# Patient Record
Sex: MALE | URBAN - METROPOLITAN AREA
[De-identification: names, ages, dates, MRNs, and addresses within clinical notes are randomized per-mention and may not be internally consistent; named-entity substitution may affect disease eponyms.]

---

## 2024-03-13 ENCOUNTER — OFFICE VISIT (OUTPATIENT)
Dept: MEDICAL GROUP | Facility: IMAGING CENTER | Age: 34
End: 2024-03-13
Payer: COMMERCIAL

## 2024-03-13 VITALS
TEMPERATURE: 98.3 F | DIASTOLIC BLOOD PRESSURE: 76 MMHG | HEIGHT: 68 IN | WEIGHT: 299 LBS | OXYGEN SATURATION: 98 % | BODY MASS INDEX: 45.31 KG/M2 | RESPIRATION RATE: 16 BRPM | SYSTOLIC BLOOD PRESSURE: 134 MMHG | HEART RATE: 82 BPM

## 2024-03-13 DIAGNOSIS — Z11.4 SCREENING FOR HIV (HUMAN IMMUNODEFICIENCY VIRUS): ICD-10-CM

## 2024-03-13 DIAGNOSIS — K92.1 HEMATOCHEZIA: ICD-10-CM

## 2024-03-13 DIAGNOSIS — Z11.59 NEED FOR HEPATITIS C SCREENING TEST: ICD-10-CM

## 2024-03-13 DIAGNOSIS — H91.93 DECREASED HEARING OF BOTH EARS: ICD-10-CM

## 2024-03-13 DIAGNOSIS — Z13.21 ENCOUNTER FOR VITAMIN DEFICIENCY SCREENING: ICD-10-CM

## 2024-03-13 DIAGNOSIS — K52.9 FREQUENT STOOLS: ICD-10-CM

## 2024-03-13 DIAGNOSIS — E66.01 CLASS 3 SEVERE OBESITY DUE TO EXCESS CALORIES WITHOUT SERIOUS COMORBIDITY WITH BODY MASS INDEX (BMI) OF 45.0 TO 49.9 IN ADULT (HCC): ICD-10-CM

## 2024-03-13 PROBLEM — E66.813 CLASS 3 SEVERE OBESITY DUE TO EXCESS CALORIES WITHOUT SERIOUS COMORBIDITY WITH BODY MASS INDEX (BMI) OF 45.0 TO 49.9 IN ADULT (HCC): Status: ACTIVE | Noted: 2024-03-13

## 2024-03-13 PROCEDURE — 3075F SYST BP GE 130 - 139MM HG: CPT | Performed by: STUDENT IN AN ORGANIZED HEALTH CARE EDUCATION/TRAINING PROGRAM

## 2024-03-13 PROCEDURE — 99204 OFFICE O/P NEW MOD 45 MIN: CPT | Performed by: STUDENT IN AN ORGANIZED HEALTH CARE EDUCATION/TRAINING PROGRAM

## 2024-03-13 PROCEDURE — 3078F DIAST BP <80 MM HG: CPT | Performed by: STUDENT IN AN ORGANIZED HEALTH CARE EDUCATION/TRAINING PROGRAM

## 2024-03-13 ASSESSMENT — PATIENT HEALTH QUESTIONNAIRE - PHQ9: CLINICAL INTERPRETATION OF PHQ2 SCORE: 0

## 2024-03-13 NOTE — PROGRESS NOTES
Subjective:       CC:   Chief Complaint   Patient presents with    Establish Care    Ear Fullness    Referral Needed     GI       HPI:   Luke is a 33 y.o. male is new to me and is here today to establish care. Previous PCP was none. Specialists:none. Pt would like to discuss the following today:      Establish care: has not seen a PCP in several years.     Referral to GI: Reports having multiple bowel movements throughout the day. Has noticed bloody liquid stools, this is intermittent and has symptoms about every other week. Symptoms started a few years ago.  Denies family history of colon cancer. Denies abdominal pain, nausea, vomiting, unintentional weight loss ,and constipation. Denies history of GI disease.     Referral for hearing test: states his wife is concerned about his hearing because he can't hear at times.  States he misses words from a conversation. Denies pain, injuries, and trauma.     Obesity: states he gained a lot of weight during winter. He is planning on having a baby soon. He is also planning on joining a gym soon.     Health Maintenance/Immunizations: Recommend COVID-19 and influenza immunizations.  Patient declines vaccines.  Per patient he is up-to-date with Tdap.    ROS:   See HPI    Patient Active Problem List    Diagnosis Date Noted    Class 3 severe obesity due to excess calories without serious comorbidity with body mass index (BMI) of 45.0 to 49.9 in adult (HCC) 03/13/2024    Hematochezia 03/13/2024    Frequent stools 03/13/2024    Decreased hearing of both ears 03/13/2024       History reviewed. No pertinent past medical history.    No current outpatient medications on file.     No current facility-administered medications for this visit.       Allergies as of 03/13/2024    (No Known Allergies)       Social History     Socioeconomic History    Marital status: Single     Spouse name: Not on file    Number of children: Not on file    Years of education: Not on file    Highest  "education level: Not on file   Occupational History    Not on file   Tobacco Use    Smoking status: Former     Types: Cigarettes    Smokeless tobacco: Never    Tobacco comments:     1/2 ppd   Vaping Use    Vaping Use: Former   Substance and Sexual Activity    Alcohol use: Yes     Comment: Occasionally    Drug use: Yes     Types: Marijuana     Comment: daily    Sexual activity: Yes     Partners: Female   Other Topics Concern    Not on file   Social History Narrative    Not on file     Social Determinants of Health     Financial Resource Strain: Not on file   Food Insecurity: Not on file   Transportation Needs: Not on file   Physical Activity: Not on file   Stress: Not on file   Social Connections: Not on file   Intimate Partner Violence: Not on file   Housing Stability: Not on file       Family History   Problem Relation Age of Onset    No Known Problems Mother     Hyperlipidemia Father     Hypertension Father     Cancer Sister         Thyroid    Diabetes Neg Hx        History reviewed. No pertinent surgical history.        Objective:     /76 (BP Location: Left arm, Patient Position: Sitting, BP Cuff Size: Large adult)   Pulse 82   Temp 36.8 °C (98.3 °F) (Temporal)   Resp 16   Ht 1.727 m (5' 8\")   Wt (!) 136 kg (299 lb)   SpO2 98%   BMI 45.46 kg/m²     Physical Exam  Vitals reviewed.   Constitutional:       General: He is not in acute distress.     Appearance: Normal appearance.   HENT:      Head: Normocephalic and atraumatic.      Right Ear: Tympanic membrane, ear canal and external ear normal. There is no impacted cerumen.      Left Ear: Tympanic membrane, ear canal and external ear normal. There is no impacted cerumen.      Nose: No congestion.      Mouth/Throat:      Mouth: Mucous membranes are moist.      Pharynx: Oropharynx is clear. No oropharyngeal exudate or posterior oropharyngeal erythema.   Eyes:      General: No scleral icterus.  Neck:      Thyroid: No thyroid mass or thyromegaly.      " Vascular: No carotid bruit.   Cardiovascular:      Rate and Rhythm: Normal rate and regular rhythm.      Pulses: Normal pulses.      Heart sounds: Normal heart sounds. No murmur heard.  Pulmonary:      Effort: Pulmonary effort is normal. No respiratory distress.      Breath sounds: Normal breath sounds. No wheezing.   Abdominal:      General: Bowel sounds are normal. There is no distension.      Palpations: Abdomen is soft. There is no mass.      Tenderness: There is no abdominal tenderness.   Musculoskeletal:         General: No swelling. Normal range of motion.      Cervical back: Normal range of motion and neck supple. No tenderness.   Lymphadenopathy:      Cervical: No cervical adenopathy.   Skin:     General: Skin is warm and dry.      Coloration: Skin is not jaundiced.      Findings: No bruising.   Neurological:      General: No focal deficit present.      Mental Status: He is alert and oriented to person, place, and time.      Gait: Gait normal.   Psychiatric:         Mood and Affect: Mood normal.         Behavior: Behavior normal.         Thought Content: Thought content normal.         Judgment: Judgment normal.            Assessment and Plan:     1. Frequent stools  2. Hematochezia  Chronic. Unknown etiology.  Referred to GI for colonoscopy.  Strict ED precautions given.  - Referral to Gastroenterology    3. Decreased hearing of both ears  Chronic.  There is a small amount of cerumen in both ears, otherwise normal examination.  Referred to audiology for hearing test.  -Referral to Audiology    4. Class 3 severe obesity due to excess calories without serious comorbidity with body mass index (BMI) of 45.0 to 49.9 in adult (HCC)  Chronic and uncontrolled.  Per patient he gained a lot of weight this winter.  Discussed lifestyle modifications to help lose weight and help prevent disease such as diabetes and heart disease.  Screening labs as listed below ordered.  Improve your eating habits. Eat a variety of  foods from each food group: include whole grains, vegetables, fruits, low fat or fat free dairy, and protein foods, mostly plant based. Limit foods high in fat, sugar, calories, and sodium. Eat slowly, and don't do anything else, such as watch TV, while you are eating. Pay attention to portion sizes. Put your food on a smaller plate, follow MyPlate guidelines:vegetables make up the largest section, followed by grains. Together, fruits and vegetables fill half the plate, while proteins and grains fill the other half.  Regular activity can help you feel better, have more energy, and burn more calories. If you haven't been active, start slowly. Start with at least 30 minutes of moderate activity on most days of the week; then gradually increase the amount of activity. Try for 60 or 90 minutes a day, at least 5 days a week.   - HEMOGLOBIN A1C; Future  - TSH WITH REFLEX TO FT4; Future  - Comp Metabolic Panel; Future  - CBC WITH DIFFERENTIAL; Future  - Lipid Profile; Future    5. Need for hepatitis C screening test  - HEP C VIRUS ANTIBODY; Future    6. Screening for HIV (human immunodeficiency virus)  - HIV AG/AB COMBO ASSAY SCREENING; Future    7. Encounter for vitamin deficiency screening  - VITAMIN D 25-HYDROXY       Diagnosis and treatment plan explained to pt. Pt agreed with treatment plan and verbalized understanding.       Return for Schedule follow-up after completing tests.     Please note that this dictation was created using voice recognition software. I have made every reasonable attempt to correct obvious errors, but I expect that there are errors of grammar and possibly content that I did not discover before finalizing the note.    Miriam Katz PA-C  [facility]

## 2024-03-13 NOTE — PATIENT INSTRUCTIONS
Thank you for choosing [facility]. It was a pleasure meeting you today.     Take care!  Miriam VazquezWell[facility]

## 2024-03-21 DIAGNOSIS — Z01.89 ENCOUNTER FOR LABORATORY TEST: ICD-10-CM

## 2024-04-10 ENCOUNTER — APPOINTMENT (OUTPATIENT)
Dept: MEDICAL GROUP | Facility: IMAGING CENTER | Age: 34
End: 2024-04-10
Payer: COMMERCIAL

## 2024-04-10 ENCOUNTER — HOSPITAL ENCOUNTER (OUTPATIENT)
Dept: LAB | Facility: MEDICAL CENTER | Age: 34
End: 2024-04-10
Attending: STUDENT IN AN ORGANIZED HEALTH CARE EDUCATION/TRAINING PROGRAM
Payer: COMMERCIAL

## 2024-04-10 DIAGNOSIS — E66.01 CLASS 3 SEVERE OBESITY DUE TO EXCESS CALORIES WITHOUT SERIOUS COMORBIDITY WITH BODY MASS INDEX (BMI) OF 45.0 TO 49.9 IN ADULT (HCC): ICD-10-CM

## 2024-04-10 DIAGNOSIS — Z01.89 ENCOUNTER FOR LABORATORY TEST: ICD-10-CM

## 2024-04-10 DIAGNOSIS — Z11.4 SCREENING FOR HIV (HUMAN IMMUNODEFICIENCY VIRUS): ICD-10-CM

## 2024-04-10 DIAGNOSIS — Z11.59 NEED FOR HEPATITIS C SCREENING TEST: ICD-10-CM

## 2024-04-10 LAB
25(OH)D3 SERPL-MCNC: 34 NG/ML (ref 30–100)
ALBUMIN SERPL BCP-MCNC: 4.7 G/DL (ref 3.2–4.9)
ALBUMIN/GLOB SERPL: 1.8 G/DL
ALP SERPL-CCNC: 74 U/L (ref 30–99)
ALT SERPL-CCNC: 40 U/L (ref 2–50)
ANION GAP SERPL CALC-SCNC: 12 MMOL/L (ref 7–16)
AST SERPL-CCNC: 32 U/L (ref 12–45)
BASOPHILS # BLD AUTO: 0.4 % (ref 0–1.8)
BASOPHILS # BLD: 0.03 K/UL (ref 0–0.12)
BILIRUB SERPL-MCNC: 0.3 MG/DL (ref 0.1–1.5)
BUN SERPL-MCNC: 21 MG/DL (ref 8–22)
CALCIUM ALBUM COR SERPL-MCNC: 8.9 MG/DL (ref 8.5–10.5)
CALCIUM SERPL-MCNC: 9.5 MG/DL (ref 8.5–10.5)
CHLORIDE SERPL-SCNC: 104 MMOL/L (ref 96–112)
CHOLEST SERPL-MCNC: 243 MG/DL (ref 100–199)
CO2 SERPL-SCNC: 26 MMOL/L (ref 20–33)
CREAT SERPL-MCNC: 0.75 MG/DL (ref 0.5–1.4)
EOSINOPHIL # BLD AUTO: 0.16 K/UL (ref 0–0.51)
EOSINOPHIL NFR BLD: 2 % (ref 0–6.9)
ERYTHROCYTE [DISTWIDTH] IN BLOOD BY AUTOMATED COUNT: 42.4 FL (ref 35.9–50)
EST. AVERAGE GLUCOSE BLD GHB EST-MCNC: 120 MG/DL
FASTING STATUS PATIENT QL REPORTED: NORMAL
GFR SERPLBLD CREATININE-BSD FMLA CKD-EPI: 121 ML/MIN/1.73 M 2
GLOBULIN SER CALC-MCNC: 2.6 G/DL (ref 1.9–3.5)
GLUCOSE SERPL-MCNC: 102 MG/DL (ref 65–99)
HBA1C MFR BLD: 5.8 % (ref 4–5.6)
HCT VFR BLD AUTO: 50.4 % (ref 42–52)
HCV AB SER QL: NORMAL
HDLC SERPL-MCNC: 43 MG/DL
HGB BLD-MCNC: 16.8 G/DL (ref 14–18)
HIV 1+2 AB+HIV1 P24 AG SERPL QL IA: NORMAL
IMM GRANULOCYTES # BLD AUTO: 0.09 K/UL (ref 0–0.11)
IMM GRANULOCYTES NFR BLD AUTO: 1.1 % (ref 0–0.9)
LDLC SERPL CALC-MCNC: 179 MG/DL
LYMPHOCYTES # BLD AUTO: 1.79 K/UL (ref 1–4.8)
LYMPHOCYTES NFR BLD: 22.2 % (ref 22–41)
MCH RBC QN AUTO: 29.7 PG (ref 27–33)
MCHC RBC AUTO-ENTMCNC: 33.3 G/DL (ref 32.3–36.5)
MCV RBC AUTO: 89.2 FL (ref 81.4–97.8)
MONOCYTES # BLD AUTO: 0.81 K/UL (ref 0–0.85)
MONOCYTES NFR BLD AUTO: 10 % (ref 0–13.4)
NEUTROPHILS # BLD AUTO: 5.18 K/UL (ref 1.82–7.42)
NEUTROPHILS NFR BLD: 64.3 % (ref 44–72)
NRBC # BLD AUTO: 0 K/UL
NRBC BLD-RTO: 0 /100 WBC (ref 0–0.2)
PLATELET # BLD AUTO: 158 K/UL (ref 164–446)
PMV BLD AUTO: 13 FL (ref 9–12.9)
POTASSIUM SERPL-SCNC: 4.8 MMOL/L (ref 3.6–5.5)
PROT SERPL-MCNC: 7.3 G/DL (ref 6–8.2)
RBC # BLD AUTO: 5.65 M/UL (ref 4.7–6.1)
SODIUM SERPL-SCNC: 142 MMOL/L (ref 135–145)
TRIGL SERPL-MCNC: 105 MG/DL (ref 0–149)
TSH SERPL DL<=0.005 MIU/L-ACNC: 1.38 UIU/ML (ref 0.38–5.33)
WBC # BLD AUTO: 8.1 K/UL (ref 4.8–10.8)

## 2024-04-10 PROCEDURE — 84270 ASSAY OF SEX HORMONE GLOBUL: CPT

## 2024-04-10 PROCEDURE — 86803 HEPATITIS C AB TEST: CPT

## 2024-04-10 PROCEDURE — 85025 COMPLETE CBC W/AUTO DIFF WBC: CPT

## 2024-04-10 PROCEDURE — 36415 COLL VENOUS BLD VENIPUNCTURE: CPT

## 2024-04-10 PROCEDURE — 87389 HIV-1 AG W/HIV-1&-2 AB AG IA: CPT

## 2024-04-10 PROCEDURE — 84403 ASSAY OF TOTAL TESTOSTERONE: CPT

## 2024-04-10 PROCEDURE — 83036 HEMOGLOBIN GLYCOSYLATED A1C: CPT

## 2024-04-10 PROCEDURE — 80053 COMPREHEN METABOLIC PANEL: CPT

## 2024-04-10 PROCEDURE — 80061 LIPID PANEL: CPT

## 2024-04-10 PROCEDURE — 84443 ASSAY THYROID STIM HORMONE: CPT

## 2024-04-10 PROCEDURE — 84402 ASSAY OF FREE TESTOSTERONE: CPT

## 2024-04-10 PROCEDURE — 82306 VITAMIN D 25 HYDROXY: CPT

## 2024-04-12 PROBLEM — E78.5 DYSLIPIDEMIA: Status: ACTIVE | Noted: 2024-04-12

## 2024-04-12 PROBLEM — R73.03 PREDIABETES: Status: ACTIVE | Noted: 2024-04-12

## 2024-04-12 PROBLEM — D69.6 THROMBOCYTOPENIA (HCC): Status: ACTIVE | Noted: 2024-04-12

## 2024-04-12 PROBLEM — D72.828 GRANULOCYTOSIS: Status: ACTIVE | Noted: 2024-04-12

## 2024-04-12 LAB
SHBG SERPL-SCNC: 28 NMOL/L (ref 17–56)
TESTOST FREE MFR SERPL: 2 % (ref 1.6–2.9)
TESTOST FREE SERPL-MCNC: 78 PG/ML (ref 47–244)
TESTOST SERPL-MCNC: 389 NG/DL (ref 300–1080)

## 2024-04-22 ENCOUNTER — TELEMEDICINE (OUTPATIENT)
Dept: MEDICAL GROUP | Facility: IMAGING CENTER | Age: 34
End: 2024-04-22
Payer: COMMERCIAL

## 2024-04-22 ENCOUNTER — SUPERVISING PHYSICIAN REVIEW (OUTPATIENT)
Dept: MEDICAL GROUP | Facility: IMAGING CENTER | Age: 34
End: 2024-04-22

## 2024-04-22 VITALS — TEMPERATURE: 97.5 F | WEIGHT: 298 LBS | BODY MASS INDEX: 45.16 KG/M2 | HEIGHT: 68 IN

## 2024-04-22 DIAGNOSIS — K92.1 HEMATOCHEZIA: ICD-10-CM

## 2024-04-22 DIAGNOSIS — R73.03 PREDIABETES: ICD-10-CM

## 2024-04-22 DIAGNOSIS — E78.5 DYSLIPIDEMIA: ICD-10-CM

## 2024-04-22 DIAGNOSIS — D69.6 THROMBOCYTOPENIA (HCC): ICD-10-CM

## 2024-04-22 DIAGNOSIS — D72.828 GRANULOCYTOSIS: ICD-10-CM

## 2024-04-22 PROCEDURE — 99214 OFFICE O/P EST MOD 30 MIN: CPT | Mod: 95 | Performed by: STUDENT IN AN ORGANIZED HEALTH CARE EDUCATION/TRAINING PROGRAM

## 2024-04-22 ASSESSMENT — FIBROSIS 4 INDEX: FIB4 SCORE: 1.09

## 2024-04-22 NOTE — PROGRESS NOTES
"Virtual Visit: Established Patient   This visit was conducted via Zoom using secure and encrypted videoconferencing technology. The patient was in a private location in the state of [place].    The patient's identity was confirmed and verbal consent was obtained for this virtual visit.    Subjective:   CC:   Chief Complaint   Patient presents with    Follow-Up       Luke Singleton Jr. is a 33 y.o. male presenting for evaluation and management of:      Test results: completed labs 4/10/24    Thrombocytopenia: new finding. Denies history of bleeding or clotting disorders.     Prediabetes: new finding. A1C is 5.8. States he has not been exercising. He moved to a new apartment complex that has a gym. He's planning on joining the gym.     Dyslipidemia: new finding. States he eats red meat on occasion. He eats mostly chicken or ground turkey. He eats vegetables.     Hematochezia: states he has not scheduled appt with GI. He noticed some bleeding today.     ROS:   See HPI      Medications, allergies, past medical history, family history, surgical history, and social history documented in chart and reviewed by me.        Objective:   Temp 36.4 °C (97.5 °F)   Ht 1.727 m (5' 8\")   Wt (!) 135 kg (298 lb)   BMI 45.31 kg/m²     Physical Exam:   Constitutional: Alert, no distress, well-groomed.  Skin: No rashes in visible areas.  Eyes: Round. Conjunctiva clear, lids normal. No icterus.   ENMT: Lips pink without lesions, moist mucous membranes. Phonation normal.  Neck: No visible masses or thyromegaly.   Respiratory: Unlabored respiratory effort, no cough or audible wheeze.  Psych: Alert and oriented x3, normal affect and mood.       Assessment and Plan:     1. Dyslipidemia  New diagnosis. Lipid panel reviewed with pt today. Recommend healthy diet and exercise. Will repeat lipid panel in 3 months.   I recommend the following to help lower your cholesterol:  Decrease intake of saturated fat. Sources of saturated fat " include:  Processed meat, including hot dogs, sausage, duncan and pepperoni.  Fatty cuts of meat, including ribs, poultry with the skin and highly marbled meat.  Full-fat dairy products, including butter, heavy cream, cream cheese and sour cream.  Coconut oil and palm oil.  Fried food.  2. Increase intake of poly-and monounsaturated fats (avocado, nuts, olive oil, soybean oil, corn oil, sunflower oil)  3. Increase intake of soluble fiber (oats, peas, beans, apples, citrus fruits, carrots, barley and psyllium)  4. Increase consumption of tree nuts  5. Increase intake of soy protein  6. Increase intake of omega-3 fatty acids from marine sources (fatty fish like salmon, flax seeds, zhen seeds, walnuts.)  7. Follow a Mediterranean diet (Olive oil is main dietary fat, limited amounts of red meat, dairy products, eggs, and poultry; increased amounts of vegetables, whole grains, fish, and tree nuts).  8. Physical activity for at least 30 minutes most days of the week   Latest Reference Range & Units 04/10/24 06:17   Cholesterol,Tot 100 - 199 mg/dL 243 (H)   Triglycerides 0 - 149 mg/dL 105   HDL >=40 mg/dL 43   LDL <100 mg/dL 179 (H)   (H): Data is abnormally high       2. Thrombocytopenia (HCC)  Acute.  New finding.  Denies history of bleeding or clotting disorders.  Will repeat CBC.   Latest Reference Range & Units 04/10/24 06:17   WBC 4.8 - 10.8 K/uL 8.1   RBC 4.70 - 6.10 M/uL 5.65   Hemoglobin 14.0 - 18.0 g/dL 16.8   Hematocrit 42.0 - 52.0 % 50.4   MCV 81.4 - 97.8 fL 89.2   MCH 27.0 - 33.0 pg 29.7   MCHC 32.3 - 36.5 g/dL 33.3   RDW 35.9 - 50.0 fL 42.4   Platelet Count 164 - 446 K/uL 158 (L)   MPV 9.0 - 12.9 fL 13.0 (H)   (L): Data is abnormally low  (H): Data is abnormally high    3. Prediabetes  New diagnosis.  Recommend healthy diet and physical activity.  Will monitor.   Latest Reference Range & Units 04/10/24 06:17   Glycohemoglobin 4.0 - 5.6 % 5.8 (H)   (H): Data is abnormally high    4. Granulocytosis  Acute.  New  finding.  Will repeat CBC   Latest Reference Range & Units 04/10/24 06:17   Immature Granulocytes 0.00 - 0.90 % 1.10 (H)   (H): Data is abnormally high    5. Hematochezia  Chronic and persistent.  Patient was referred to [facility].  Contact information provided to patient.  Advised patient to call [facility] to schedule appointment as soon as possible.  Strict ED precautions given.    Orders Placed This Encounter    CBC WITH DIFFERENTIAL    Lipid Profile          Diagnosis and treatment plan explained to pt.  Pt agreed with treatment plan and verbalized understanding.     Return in about 3 months (around 7/22/2024) for dyslipidemia f/u.     Please note that this dictation was created using voice recognition software. I have made every reasonable attempt to correct obvious errors, but I expect that there are errors of grammar and possibly content that I did not discover before finalizing the note.    Miriam Katz PA-C  [facility]

## 2024-05-29 NOTE — PROGRESS NOTES
I have reviewed and agree with history, assessment and plan for office encounter on 4/22/24 with Advanced Practice Provider: Miriam Katz P.A.-C.  Face to face encounter/direct observation: No  Suggested changes to plan or follow-up: none   Marly García M.D.

## 2025-01-31 ENCOUNTER — OFFICE VISIT (OUTPATIENT)
Dept: CARDIOLOGY | Facility: MEDICAL CENTER | Age: 35
End: 2025-01-31
Attending: INTERNAL MEDICINE
Payer: COMMERCIAL

## 2025-01-31 VITALS
HEART RATE: 79 BPM | WEIGHT: 304 LBS | SYSTOLIC BLOOD PRESSURE: 120 MMHG | RESPIRATION RATE: 16 BRPM | OXYGEN SATURATION: 98 % | BODY MASS INDEX: 46.07 KG/M2 | DIASTOLIC BLOOD PRESSURE: 88 MMHG | HEIGHT: 68 IN

## 2025-01-31 DIAGNOSIS — Z01.810 PRE-OPERATIVE CARDIOVASCULAR EXAMINATION: ICD-10-CM

## 2025-01-31 DIAGNOSIS — E78.00 PURE HYPERCHOLESTEROLEMIA: ICD-10-CM

## 2025-01-31 DIAGNOSIS — E66.01 SEVERE OBESITY (HCC): ICD-10-CM

## 2025-01-31 LAB — EKG IMPRESSION: NORMAL

## 2025-01-31 PROCEDURE — 99212 OFFICE O/P EST SF 10 MIN: CPT | Performed by: INTERNAL MEDICINE

## 2025-01-31 PROCEDURE — 93005 ELECTROCARDIOGRAM TRACING: CPT | Mod: TC | Performed by: INTERNAL MEDICINE

## 2025-01-31 RX ORDER — ACETAMINOPHEN 325 MG/1
TABLET ORAL
COMMUNITY

## 2025-01-31 RX ORDER — POLYETHYLENE GLYCOL 3350, SODIUM SULFATE ANHYDROUS, SODIUM BICARBONATE, SODIUM CHLORIDE, POTASSIUM CHLORIDE 236; 22.74; 6.74; 5.86; 2.97 G/4L; G/4L; G/4L; G/4L; G/4L
POWDER, FOR SOLUTION ORAL
COMMUNITY
Start: 2025-01-10 | End: 2025-01-31

## 2025-01-31 RX ORDER — DILTIAZEM HYDROCHLORIDE 30 MG/1
30 TABLET, FILM COATED ORAL
COMMUNITY
Start: 2025-01-10 | End: 2025-01-31

## 2025-01-31 RX ORDER — HYDROCORTISONE ACETATE 25 MG/1
SUPPOSITORY RECTAL
COMMUNITY
Start: 2025-01-10

## 2025-01-31 RX ORDER — ERGOCALCIFEROL (VITAMIN D2) 10 MCG
TABLET ORAL
COMMUNITY

## 2025-01-31 ASSESSMENT — ENCOUNTER SYMPTOMS
EYE DISCHARGE: 0
ORTHOPNEA: 0
BRUISES/BLEEDS EASILY: 0
ABDOMINAL PAIN: 0
SPEECH CHANGE: 0
DEPRESSION: 0
HALLUCINATIONS: 0
SHORTNESS OF BREATH: 0
BLOOD IN STOOL: 0
BLURRED VISION: 0
NAUSEA: 0
WEIGHT LOSS: 0
VOMITING: 0
DIZZINESS: 0
PALPITATIONS: 0
PND: 0
MYALGIAS: 0
LOSS OF CONSCIOUSNESS: 0
EYE PAIN: 0
FALLS: 0
CHILLS: 0
HEADACHES: 0
CLAUDICATION: 0
SENSORY CHANGE: 0
FEVER: 0
COUGH: 0
DOUBLE VISION: 0

## 2025-01-31 ASSESSMENT — FIBROSIS 4 INDEX: FIB4 SCORE: 1.12

## 2025-01-31 NOTE — LETTER
PROCEDURE/SURGERY CLEARANCE FORM      Encounter Date: 1/31/2025    Patient: Luke Singleton Jr.  YOB: 1990    CARDIOLOGIST:  Veronica Prado M.D.    REFERRING DOCTOR:  Mira Meyer A.P.N.    PATIENT does not have  PPM.    PATIENT does not have  AICD.    The above patient is cleared to have the following procedure/surgery: yes                                           Additional comments: Ok to proceed with colonoscopy.                 MD Justina Prado M.D.

## 2025-02-01 NOTE — PROGRESS NOTES
Chief Complaint   Patient presents with    Establish Care     NP: DX: Pre-operative cardiovascular examination  Obesity, unspecified  Pressure in chest           Subjective     Luke Singleton Jr. is a 34 y.o. male who presents today for cardiac care and evaluation before his upcoming colonoscopy.  Patient is asymptomatic this time.  No chest entrance of breath.  He is able to exert himself without symptoms of chest pain or shortness of breath.  No prior prior procedure or surgery.    I have personally interpreted EKG today with patient, there is no evidence of acute coronary syndrome, no evidence of prior infarct, normal [place] and QT interval, no significant conduction disease. Sinus rhythm.    No family history of sudden cardiac death.    I have independently interpreted and reviewed blood tests results with patient in clinic today which showed LDL level of 179, triglycerides level of 105, GFR of 121, K of 4.8, Hgba1c of 5..      Past Medical History:   Diagnosis Date    Dyslipidemia 04/12/2024    Granulocytosis 04/12/2024    Prediabetes 04/12/2024     History reviewed. No pertinent surgical history.  Family History   Problem Relation Age of Onset    No Known Problems Mother     Hyperlipidemia Father     Hypertension Father     Cancer Sister         Thyroid    Diabetes Neg Hx      Social History     Socioeconomic History    Marital status:      Spouse name: Not on file    Number of children: Not on file    Years of education: Not on file    Highest education level: Not on file   Occupational History    Not on file   Tobacco Use    Smoking status: Every Day     Types: Cigarettes    Smokeless tobacco: Never    Tobacco comments:     7-10 a day    Vaping Use    Vaping status: Former   Substance and Sexual Activity    Alcohol use: Yes     Comment: Occasionally/ rare    Drug use: Yes     Types: Marijuana     Comment: daily    Sexual activity: Yes     Partners: Female   Other Topics Concern    Not on file   Social  History Narrative    Not on file     Social Drivers of Health     Financial Resource Strain: Not on file   Food Insecurity: Not on file   Transportation Needs: Not on file   Physical Activity: Not on file   Stress: Not on file   Social Connections: Not on file   Intimate Partner Violence: Not on file   Housing Stability: Not on file     No Known Allergies  Outpatient Encounter Medications as of 1/31/2025   Medication Sig Dispense Refill    hydrocortisone (ANUSOL-HC) 25 MG Suppos UNWRAP AND INSERT 1 SUPPOSITORY RECTALLY TWICE A DAY FOR 10 DAYS      Ascorbic Acid 1000 MG Tab CR 0      acetaminophen (TYLENOL) 325 MG Tab 0      Ergocalciferol (VITAMIN D2) 10 MCG (400 UNIT) Tab 0      Iron, Ferrous Sulfate, 325 (65 Fe) MG Tab 0      [DISCONTINUED] GOLYTELY 236 g Recon Soln 4000 ml (Patient not taking: Reported on 1/31/2025)      [DISCONTINUED] dilTIAZem (CARDIZEM) 30 MG Tab Take 30 mg by mouth. (Patient not taking: Reported on 1/31/2025)       No facility-administered encounter medications on file as of 1/31/2025.     Review of Systems   Constitutional:  Negative for chills, fever, malaise/fatigue and weight loss.   HENT:  Negative for ear discharge, ear pain, hearing loss and nosebleeds.    Eyes:  Negative for blurred vision, double vision, pain and discharge.   Respiratory:  Negative for cough and shortness of breath.    Cardiovascular:  Negative for chest pain, palpitations, orthopnea, claudication, leg swelling and PND.   Gastrointestinal:  Negative for abdominal pain, blood in stool, melena, nausea and vomiting.   Genitourinary:  Negative for dysuria and hematuria.   Musculoskeletal:  Negative for falls, joint pain and myalgias.   Skin:  Negative for itching and rash.   Neurological:  Negative for dizziness, sensory change, speech change, loss of consciousness and headaches.   Endo/Heme/Allergies:  Negative for environmental allergies. Does not bruise/bleed easily.   Psychiatric/Behavioral:  Negative for depression,  "hallucinations and suicidal ideas.               Objective     /88 (BP Location: Left arm, Patient Position: Sitting, BP Cuff Size: Adult)   Pulse 79   Resp 16   Ht 1.727 m (5' 8\")   Wt (!) 138 kg (304 lb)   SpO2 98%   BMI 46.22 kg/m²     Physical Exam  Vitals and nursing note reviewed.   Constitutional:       General: He is not in acute distress.     Appearance: He is not diaphoretic.   HENT:      Head: Normocephalic and atraumatic.      Right Ear: External ear normal.      Left Ear: External ear normal.      Nose: No congestion or rhinorrhea.   Eyes:      General:         Right eye: No discharge.         Left eye: No discharge.   Neck:      Thyroid: No thyromegaly.      Vascular: No JVD.   Cardiovascular:      Rate and Rhythm: Normal rate and regular rhythm.      Pulses: Normal pulses.   Pulmonary:      Effort: No respiratory distress.   Abdominal:      General: There is no distension.      Tenderness: There is no abdominal tenderness.   Musculoskeletal:         General: No swelling or tenderness.      Right lower leg: No edema.      Left lower leg: No edema.   Skin:     General: Skin is warm and dry.   Neurological:      Mental Status: He is alert and oriented to person, place, and time.      Cranial Nerves: No cranial nerve deficit.   Psychiatric:         Behavior: Behavior normal.                Assessment & Plan     1. Pre-operative cardiovascular examination  EKG      2. Severe obesity (HCC)        3. Body mass index (BMI) of 45.0-49.9 in adult (HCC)        4. Pure hypercholesterolemia            Medical Decision Making: Today's Assessment/Status/Plan:    Patient is not in acute coronary syndrome presentation, is not having any active TIA or stroke symptoms, as not having decompensated heart failure, is not symptomatic in terms of presyncope pain or syncope. No evidence of significant valvular disease.    I explained to patient that further cardiac testing or procedures will not lower his overall " cardiovascular risk for the surgery.  Patient remains low cardiovascular risk for her intended colonoscopy.    In the future, patient would like to talk about cholesterol management.  This will be done after his colonoscopy.    This visit encounter signifies the visit complexity inherent to evaluation and management associated with medical care services that serve as the continuing focal point for all needed health care services and/or with medical care services that are part of ongoing care related to this patient's single, serious condition, complex cardiac condition.    Veronica Pardo M.D.

## 2025-02-04 ENCOUNTER — TELEPHONE (OUTPATIENT)
Dept: CARDIOLOGY | Facility: MEDICAL CENTER | Age: 35
End: 2025-02-04
Payer: COMMERCIAL

## 2025-02-04 NOTE — LETTER
PROCEDURE/SURGERY CLEARANCE FORM      Encounter Date: 2/4/2025    Patient: Luke Singleton Jr.  YOB: 1990    CARDIOLOGIST:  Veronica Prado M.D.   REFERRING DOCTOR:  No ref. provider found    The following procedure/surgery: EGD and Colonoscopy                                            Additional comments:  Patient remains low cardiovascular risk for her intended colonoscopy.   PROCEDURE/SURGERY CLEARANCE FORM    Date: 2/4/2025   Patient Name: Luke Singleton Jr.    Dear Surgeon or Proceduralist,      Thank you for your request for cardiac stratification of our mutual patient Luke Singleton Jr. 1990. We have reviewed their [facility] records; and to the best of our understanding this patient has not had stenting, ablation, watchman, cardiothoracic surgery or hospitalization for cardiovascular reasons in the past 6 months.  Luke Singleton Jr. has been seen within the past 15 months and is considered to have non-modifiable cardiac risk for this low-risk procedure/surgery. They may proceed from a cardiovascular standpoint and may hold their antiplatelet/anticoagulation as briefly as possible. Please have patient resume this medication when hemodynamically stable to do so.     Aspirin or Prasugrel   - hold 7 days prior to procedure/surgery, resume when hemodynamically stable      Clopidrogrel or Ticagrelor  - hold 7 days for all neurological procedures, hold 5 days prior to all other procedure/surgery,  resume when hemodynamically stable     Warfarin - hold 7 days for all neurological procedures, hold 5 days prior to all other procedure/surgery and coordinate with [facility] ([phone]) INR testing and dose management.      Pradaxa/Xarelto/Eliquis/Savesya - hold 1 day prior to procedure for low bleeding risk procedure, 2 days for high bleeding risk procedure, or consider holding 3 days or longer for patients with reduced kidney function (CrCl <30mL/min) or spinal/cranial  surgeries/procedures.      If they have a mechanical heart valve, please coordinate with [facility]ticoagulation [facility] ([phone]) the proper management of their anticoagulant in the periprocedural or perioperative period.      Some patients have higher risk for cardiovascular complications or holding medication. If our patient has had prior complications of holding antiplatelet or anticoagulants in the past and we have seen them after these events, we have addressed these concerns with the patient. They are at an unknown degree of increased risk for recurrent complication.  You may hold anticoagulation/antiplatelets for the procedure or surgery if the benefits of the procedure or surgery outweigh this nonmodifiable risk.      If Luke Singleton JrAbbe 1990 has new symptoms of heart failure decompensation, unstable arrythmia, or angina please reach out and we will assess the patient.      If you have other patient-specific concerns, please feel free to reach out to the patient's cardiologist directly at [phone].  Thank you,       [facility]     Electronically signed        MD Justina Prado M.D.

## 2025-02-04 NOTE — TELEPHONE ENCOUNTER
Last OV: 01/31/2025  Proposed Surgery: EGD and Colonoscopy   Surgery Date: 03/03/2025  Requesting Office Name: [facility]   Fax Number: [phone]  Preference of Location (default is surgery center unless specified by Cardiologist or [facility])  Prior Clearance Addressed: Yes   Per TT - Patient remains low cardiovascular risk for her intended colonoscopy.     Is this a general clearance? YES   Anticoags/Antiplatelets: Other none   Anticoags/Antiplatelet managed by Cardiology? NA   Outstanding Cardiac Imaging : No  Ablation, Cardioversion, Stent, Cardiac Devices, Catheterization, Watchman: No  TAVR/Valve, Mitral Clip, Watchman (including open heart),: N/A   Recent Cardiac Hospitalization: No            When: N/A  History (cardiac history):   Past Medical History:   Diagnosis Date    Dyslipidemia 04/12/2024    Granulocytosis 04/12/2024    Prediabetes 04/12/2024           Is this a dental clearance? NO  Ablation, Cardioversion, Watchman, Stents, Cath, Devices within the last 3 months? No   If yes- Send dent/al wait letter, do not forward to provider for review.     TAVR / Valve, Mitral clip within the last 6 months? No  If yes- Send dental wait letter, do not forward to provider for review.     If completing a general clearance, continue per protocol.           Surgical Clearance Letter Sent: YES   **Scan clearance request letter into [facility].**

## 2025-02-10 ENCOUNTER — TELEPHONE (OUTPATIENT)
Dept: MEDICAL GROUP | Facility: IMAGING CENTER | Age: 35
End: 2025-02-10
Payer: COMMERCIAL

## 2025-02-11 NOTE — TELEPHONE ENCOUNTER
Phone Number Called: [phone]    Call outcome: Left detailed message for patient. Informed to call back with any additional questions.    Message: Hi this is Medical Assistant Zarina at [facility] and [facility] office. Im calling in regard to a medical clearance form, it looks like we need to schedule an appointment for that. If you could give us a call back at [phone], thank you.

## 2025-02-18 ENCOUNTER — TELEPHONE (OUTPATIENT)
Dept: MEDICAL GROUP | Facility: IMAGING CENTER | Age: 35
End: 2025-02-18
Payer: COMMERCIAL

## 2025-02-18 NOTE — TELEPHONE ENCOUNTER
Phone Number Called: [phone]    Call outcome: Left detailed message for patient. Informed to call back with any additional questions.    Message: Luz Marina this is Zarina with [facility] and [facility] office. I'm just a calling in regards to a form we received for medical clearance. It looks like you will need an appointment to get those papers signed. You can give us a call at [phone] to get that scheduled, thank you.

## 2025-05-20 ENCOUNTER — APPOINTMENT (OUTPATIENT)
Dept: CARDIOLOGY | Facility: MEDICAL CENTER | Age: 35
End: 2025-05-20
Attending: INTERNAL MEDICINE
Payer: COMMERCIAL

## 2025-07-07 ENCOUNTER — TELEPHONE (OUTPATIENT)
Dept: MEDICAL GROUP | Facility: IMAGING CENTER | Age: 35
End: 2025-07-07

## 2025-07-14 ENCOUNTER — OFFICE VISIT (OUTPATIENT)
Dept: CARDIOLOGY | Facility: MEDICAL CENTER | Age: 35
End: 2025-07-14
Attending: NURSE PRACTITIONER
Payer: COMMERCIAL

## 2025-07-14 VITALS
BODY MASS INDEX: 47.44 KG/M2 | WEIGHT: 313 LBS | OXYGEN SATURATION: 98 % | HEIGHT: 68 IN | SYSTOLIC BLOOD PRESSURE: 118 MMHG | RESPIRATION RATE: 14 BRPM | HEART RATE: 84 BPM | DIASTOLIC BLOOD PRESSURE: 80 MMHG

## 2025-07-14 DIAGNOSIS — E78.00 PURE HYPERCHOLESTEROLEMIA: ICD-10-CM

## 2025-07-14 DIAGNOSIS — R07.89 OTHER CHEST PAIN: ICD-10-CM

## 2025-07-14 DIAGNOSIS — R06.02 SOB (SHORTNESS OF BREATH): ICD-10-CM

## 2025-07-14 LAB — EKG IMPRESSION: NORMAL

## 2025-07-14 PROCEDURE — 3074F SYST BP LT 130 MM HG: CPT | Performed by: NURSE PRACTITIONER

## 2025-07-14 PROCEDURE — 3079F DIAST BP 80-89 MM HG: CPT | Performed by: NURSE PRACTITIONER

## 2025-07-14 PROCEDURE — 93010 ELECTROCARDIOGRAM REPORT: CPT | Performed by: INTERNAL MEDICINE

## 2025-07-14 PROCEDURE — 99213 OFFICE O/P EST LOW 20 MIN: CPT | Performed by: DERMATOLOGY

## 2025-07-14 PROCEDURE — 99214 OFFICE O/P EST MOD 30 MIN: CPT | Performed by: NURSE PRACTITIONER

## 2025-07-14 PROCEDURE — 93005 ELECTROCARDIOGRAM TRACING: CPT | Mod: TC | Performed by: NURSE PRACTITIONER

## 2025-07-14 ASSESSMENT — ENCOUNTER SYMPTOMS
PND: 0
MYALGIAS: 0
ORTHOPNEA: 0
FEVER: 0
DIZZINESS: 0
COUGH: 0
ABDOMINAL PAIN: 0
CLAUDICATION: 0
SHORTNESS OF BREATH: 1
PALPITATIONS: 0

## 2025-07-14 ASSESSMENT — FIBROSIS 4 INDEX: FIB4 SCORE: 1.12

## 2025-07-14 NOTE — PROGRESS NOTES
Chief Complaint   Patient presents with    Dyslipidemia       Subjective     Luke Singleton Jr. is a 34 y.o. male who presents today for follow-up.    Patient of Dr. Prado.  He was last seen at his initial visit on 1/31/2025 with Dr. Prado at his initial visit for cardiac evaluation for his upcoming colonoscopy.    Patient was cleared for his colonoscopy.    Patient is asking to be evaluated for symptoms.  Patient wants to make sure he is healthy enough as he and his wife plan on having a family soon.    Patient reports he is trying to work on his diet, trying to eat healthier.    Patient is a current smoker, trying to cut his smoking down, currently smoking half a pack a day.    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    For symptoms, he reports an increase in shortness of breath walking, and notes having mid chest jabs and tightness with eating or dinner.  He feels his chest symptoms are likely when he eats a big bite of food.    He denies palpitations, orthopnea, PND, edema or dizziness/lightheadedness.    He does not take any regular medications, he is taking supplements.    He plans on getting back in with PCP to get routine testing and follow-up.    Patient is obese, prediabetic, has DLD.    Past Medical History[1]  Past Surgical History[2]  Family History   Problem Relation Age of Onset    No Known Problems Mother     Hyperlipidemia Father     Hypertension Father     GI Disease Father     Other Sister         thyroid nodule-benign    No Known Problems Sister     No Known Problems Brother     Diabetes Neg Hx      Social History     Socioeconomic History    Marital status:      Spouse name: Not on file    Number of children: Not on file    Years of education: Not on file    Highest education level: Not on file   Occupational History    Not on file   Tobacco Use    Smoking status: Every Day     Current packs/day: 0.50     Average  "packs/day: 0.9 packs/day for 20.5 years (17.8 ttl pk-yrs)     Types: Cigarettes     Start date: 2005    Smokeless tobacco: Never    Tobacco comments:     7-10 a day    Vaping Use    Vaping status: Former   Substance and Sexual Activity    Alcohol use: Yes     Comment: Occasionally/ rare    Drug use: Yes     Types: Marijuana     Comment: daily    Sexual activity: Yes     Partners: Female   Other Topics Concern    Not on file   Social History Narrative    Not on file     Social Drivers of Health     Financial Resource Strain: Not on file   Food Insecurity: Not on file   Transportation Needs: Not on file   Physical Activity: Not on file   Stress: Not on file   Social Connections: Not on file   Intimate Partner Violence: Not on file   Housing Stability: Not on file     Allergies[3]  Encounter Medications[4]  Review of Systems   Constitutional:  Negative for fever and malaise/fatigue.   Respiratory:  Positive for shortness of breath. Negative for cough.    Cardiovascular:  Positive for chest pain (Tightness and jab sensation). Negative for palpitations, orthopnea, claudication, leg swelling and PND.   Gastrointestinal:  Negative for abdominal pain.   Musculoskeletal:  Negative for myalgias.   Neurological:  Negative for dizziness.   All other systems reviewed and are negative.             Objective     /80 (BP Location: Left arm, Patient Position: Sitting, BP Cuff Size: Adult)   Pulse 84   Resp 14   Ht 1.727 m (5' 8\")   Wt (!) 142 kg (313 lb)   SpO2 98%   BMI 47.59 kg/m²     Physical Exam  Vitals reviewed.   Constitutional:       Appearance: He is well-developed.   HENT:      Head: Normocephalic and atraumatic.   Eyes:      Pupils: Pupils are equal, round, and reactive to light.   Neck:      Vascular: No JVD.   Cardiovascular:      Rate and Rhythm: Normal rate and regular rhythm.      Heart sounds: Normal heart sounds.   Pulmonary:      Effort: Pulmonary effort is normal. No respiratory distress.      Breath " sounds: Normal breath sounds. No wheezing or rales.   Abdominal:      General: Bowel sounds are normal.      Palpations: Abdomen is soft.   Musculoskeletal:         General: Normal range of motion.      Cervical back: Normal range of motion and neck supple.      Right lower leg: No edema.      Left lower leg: No edema.   Skin:     General: Skin is warm and dry.   Neurological:      General: No focal deficit present.      Mental Status: He is alert and oriented to person, place, and time.   Psychiatric:         Behavior: Behavior normal.       Lab Results   Component Value Date/Time    CHOLSTRLTOT 243 (H) 04/10/2024 06:17 AM     (H) 04/10/2024 06:17 AM    HDL 43 04/10/2024 06:17 AM    TRIGLYCERIDE 105 04/10/2024 06:17 AM       Lab Results   Component Value Date/Time    SODIUM 142 04/10/2024 06:17 AM    POTASSIUM 4.8 04/10/2024 06:17 AM    CHLORIDE 104 04/10/2024 06:17 AM    CO2 26 04/10/2024 06:17 AM    GLUCOSE 102 (H) 04/10/2024 06:17 AM    BUN 21 04/10/2024 06:17 AM    CREATININE 0.75 04/10/2024 06:17 AM     Lab Results   Component Value Date/Time    ALKPHOSPHAT 74 04/10/2024 06:17 AM    ASTSGOT 32 04/10/2024 06:17 AM    ALTSGPT 40 04/10/2024 06:17 AM    TBILIRUBIN 0.3 04/10/2024 06:17 AM          Assessment & Plan     1. Body mass index (BMI) of 45.0-49.9 in adult (HCC)  Lipid Profile    Lipoprotein (a)    EKG    CT-CORONARY CTA (CCTA) W/O CALCIUM SCORE    CANCELED: EC-ECHOCARDIOGRAM REST/STRESS W/O CONT(DOES NOT INCLUDE A FULL RESTING ECHO)      2. Pure hypercholesterolemia  Lipid Profile    Lipoprotein (a)    EKG    CANCELED: EC-ECHOCARDIOGRAM REST/STRESS W/O CONT(DOES NOT INCLUDE A FULL RESTING ECHO)      3. SOB (shortness of breath)  EKG    CT-CORONARY CTA (CCTA) W/O CALCIUM SCORE    CANCELED: EC-ECHOCARDIOGRAM REST/STRESS W/O CONT(DOES NOT INCLUDE A FULL RESTING ECHO)      4. Other chest pain  EKG    CT-CORONARY CTA (CCTA) W/O CALCIUM SCORE    CANCELED: EC-ECHOCARDIOGRAM REST/STRESS W/O CONT(DOES NOT  INCLUDE A FULL RESTING ECHO)          Medical Decision Making: Today's Assessment/Status/Plan:        Chest pain versus GI problem, shortness of breath:  - EKG today shows sinus rhythm 66  - Recommend CT-CTA coronary test to evaluate for plaque, CAD  - Patient too young for CT calcium score  - He has no family history of CAD, but does have risk factors of obesity, smoking, prediabetes  - Would recommend optimizing cholesterol, but okay with patient trying lifestyle modifications.  Continue healthy food choices, but recommend adding exercise routine  - Repeat lipid panel and add a LP(a)    DLD:  - Recommendations per above    Obesity:  - Recommend weight loss    FU in clinic in 2 months with testing. Sooner if needed.    Patient verbalizes understanding and agrees with the plan of care.     PLEASE NOTE: This Note was created using voice recognition Software. I have made every reasonable attempt to correct obvious errors, but I expect that there are errors of grammar and possibly content that I did not discover before finalizing the note                             [1]   Past Medical History:  Diagnosis Date    Dyslipidemia 04/12/2024    Granulocytosis 04/12/2024    Prediabetes 04/12/2024   [2] History reviewed. No pertinent surgical history.  [3] No Known Allergies  [4]   Outpatient Encounter Medications as of 7/14/2025   Medication Sig Dispense Refill    hydrocortisone (ANUSOL-HC) 25 MG Suppos UNWRAP AND INSERT 1 SUPPOSITORY RECTALLY TWICE A DAY FOR 10 DAYS      Ascorbic Acid 1000 MG Tab CR 0      acetaminophen (TYLENOL) 325 MG Tab 0      Ergocalciferol (VITAMIN D2) 10 MCG (400 UNIT) Tab 0      Iron, Ferrous Sulfate, 325 (65 Fe) MG Tab 0       No facility-administered encounter medications on file as of 7/14/2025.